# Patient Record
Sex: MALE | Race: WHITE | NOT HISPANIC OR LATINO | Employment: UNEMPLOYED | URBAN - NONMETROPOLITAN AREA
[De-identification: names, ages, dates, MRNs, and addresses within clinical notes are randomized per-mention and may not be internally consistent; named-entity substitution may affect disease eponyms.]

---

## 2022-11-14 ENCOUNTER — HOSPITAL ENCOUNTER (EMERGENCY)
Facility: HOSPITAL | Age: 22
Discharge: PSYCHIATRIC OR SUBSTANCE USE FACILITY | End: 2022-11-15
Attending: STUDENT IN AN ORGANIZED HEALTH CARE EDUCATION/TRAINING PROGRAM | Admitting: STUDENT IN AN ORGANIZED HEALTH CARE EDUCATION/TRAINING PROGRAM

## 2022-11-14 DIAGNOSIS — F19.10 POLYSUBSTANCE ABUSE: Primary | ICD-10-CM

## 2022-11-14 LAB
BASOPHILS # BLD AUTO: 0.06 10*3/MM3 (ref 0–0.2)
BASOPHILS NFR BLD AUTO: 0.7 % (ref 0–1.5)
DEPRECATED RDW RBC AUTO: 39.4 FL (ref 37–54)
EOSINOPHIL # BLD AUTO: 0.06 10*3/MM3 (ref 0–0.4)
EOSINOPHIL NFR BLD AUTO: 0.7 % (ref 0.3–6.2)
ERYTHROCYTE [DISTWIDTH] IN BLOOD BY AUTOMATED COUNT: 12.2 % (ref 12.3–15.4)
HCT VFR BLD AUTO: 44.6 % (ref 37.5–51)
HGB BLD-MCNC: 15.6 G/DL (ref 13–17.7)
IMM GRANULOCYTES # BLD AUTO: 0.02 10*3/MM3 (ref 0–0.05)
IMM GRANULOCYTES NFR BLD AUTO: 0.2 % (ref 0–0.5)
LYMPHOCYTES # BLD AUTO: 2.11 10*3/MM3 (ref 0.7–3.1)
LYMPHOCYTES NFR BLD AUTO: 25.4 % (ref 19.6–45.3)
MCH RBC QN AUTO: 30.7 PG (ref 26.6–33)
MCHC RBC AUTO-ENTMCNC: 35 G/DL (ref 31.5–35.7)
MCV RBC AUTO: 87.8 FL (ref 79–97)
MONOCYTES # BLD AUTO: 0.39 10*3/MM3 (ref 0.1–0.9)
MONOCYTES NFR BLD AUTO: 4.7 % (ref 5–12)
NEUTROPHILS NFR BLD AUTO: 5.66 10*3/MM3 (ref 1.7–7)
NEUTROPHILS NFR BLD AUTO: 68.3 % (ref 42.7–76)
NRBC BLD AUTO-RTO: 0 /100 WBC (ref 0–0.2)
PLATELET # BLD AUTO: 329 10*3/MM3 (ref 140–450)
PMV BLD AUTO: 8.5 FL (ref 6–12)
RBC # BLD AUTO: 5.08 10*6/MM3 (ref 4.14–5.8)
WBC NRBC COR # BLD: 8.3 10*3/MM3 (ref 3.4–10.8)

## 2022-11-14 PROCEDURE — 36415 COLL VENOUS BLD VENIPUNCTURE: CPT

## 2022-11-14 PROCEDURE — 80053 COMPREHEN METABOLIC PANEL: CPT | Performed by: STUDENT IN AN ORGANIZED HEALTH CARE EDUCATION/TRAINING PROGRAM

## 2022-11-14 PROCEDURE — 85025 COMPLETE CBC W/AUTO DIFF WBC: CPT | Performed by: STUDENT IN AN ORGANIZED HEALTH CARE EDUCATION/TRAINING PROGRAM

## 2022-11-14 PROCEDURE — 99285 EMERGENCY DEPT VISIT HI MDM: CPT

## 2022-11-14 PROCEDURE — 83735 ASSAY OF MAGNESIUM: CPT | Performed by: STUDENT IN AN ORGANIZED HEALTH CARE EDUCATION/TRAINING PROGRAM

## 2022-11-14 PROCEDURE — 82077 ASSAY SPEC XCP UR&BREATH IA: CPT | Performed by: STUDENT IN AN ORGANIZED HEALTH CARE EDUCATION/TRAINING PROGRAM

## 2022-11-14 RX ORDER — ONDANSETRON 4 MG/1
4 TABLET, ORALLY DISINTEGRATING ORAL ONCE
Status: COMPLETED | OUTPATIENT
Start: 2022-11-14 | End: 2022-11-15

## 2022-11-14 RX ORDER — LORAZEPAM 1 MG/1
1 TABLET ORAL ONCE
Status: COMPLETED | OUTPATIENT
Start: 2022-11-14 | End: 2022-11-15

## 2022-11-15 ENCOUNTER — HOSPITAL ENCOUNTER (INPATIENT)
Facility: HOSPITAL | Age: 22
LOS: 1 days | Discharge: LEFT BEFORE DISCHARGE | End: 2022-11-15
Attending: PSYCHIATRY & NEUROLOGY | Admitting: PSYCHIATRY & NEUROLOGY

## 2022-11-15 VITALS
WEIGHT: 188 LBS | BODY MASS INDEX: 26.92 KG/M2 | RESPIRATION RATE: 16 BRPM | HEIGHT: 70 IN | HEART RATE: 83 BPM | TEMPERATURE: 98.1 F | OXYGEN SATURATION: 99 % | SYSTOLIC BLOOD PRESSURE: 133 MMHG | DIASTOLIC BLOOD PRESSURE: 85 MMHG

## 2022-11-15 VITALS
DIASTOLIC BLOOD PRESSURE: 82 MMHG | WEIGHT: 185 LBS | BODY MASS INDEX: 26.48 KG/M2 | TEMPERATURE: 98.7 F | SYSTOLIC BLOOD PRESSURE: 127 MMHG | OXYGEN SATURATION: 98 % | HEART RATE: 116 BPM | HEIGHT: 70 IN | RESPIRATION RATE: 18 BRPM

## 2022-11-15 PROBLEM — B19.20 HEPATITIS C TEST POSITIVE: Status: ACTIVE | Noted: 2022-11-15

## 2022-11-15 PROBLEM — F15.20 METHAMPHETAMINE USE DISORDER, MODERATE, DEPENDENCE: Status: ACTIVE | Noted: 2022-11-15

## 2022-11-15 PROBLEM — F17.200 NICOTINE USE DISORDER: Status: ACTIVE | Noted: 2022-11-15

## 2022-11-15 PROBLEM — F11.20 OPIOID DEPENDENCE: Status: ACTIVE | Noted: 2022-11-15

## 2022-11-15 PROBLEM — F12.20 TETRAHYDROCANNABINOL (THC) USE DISORDER, MODERATE, DEPENDENCE: Status: ACTIVE | Noted: 2022-11-15

## 2022-11-15 LAB
ALBUMIN SERPL-MCNC: 4.6 G/DL (ref 3.5–5.2)
ALBUMIN/GLOB SERPL: 1.2 G/DL
ALP SERPL-CCNC: 68 U/L (ref 39–117)
ALT SERPL W P-5'-P-CCNC: 77 U/L (ref 1–41)
AMPHET+METHAMPHET UR QL: POSITIVE
AMPHETAMINES UR QL: POSITIVE
ANION GAP SERPL CALCULATED.3IONS-SCNC: 12.8 MMOL/L (ref 5–15)
AST SERPL-CCNC: 45 U/L (ref 1–40)
BARBITURATES UR QL SCN: NEGATIVE
BENZODIAZ UR QL SCN: NEGATIVE
BILIRUB SERPL-MCNC: 0.9 MG/DL (ref 0–1.2)
BILIRUB UR QL STRIP: NEGATIVE
BUN SERPL-MCNC: 8 MG/DL (ref 6–20)
BUN/CREAT SERPL: 10.5 (ref 7–25)
BUPRENORPHINE SERPL-MCNC: POSITIVE NG/ML
CALCIUM SPEC-SCNC: 9.6 MG/DL (ref 8.6–10.5)
CANNABINOIDS SERPL QL: POSITIVE
CHLORIDE SERPL-SCNC: 103 MMOL/L (ref 98–107)
CLARITY UR: CLEAR
CO2 SERPL-SCNC: 24.2 MMOL/L (ref 22–29)
COCAINE UR QL: NEGATIVE
COLOR UR: YELLOW
CREAT SERPL-MCNC: 0.76 MG/DL (ref 0.76–1.27)
EGFRCR SERPLBLD CKD-EPI 2021: 130.3 ML/MIN/1.73
ETHANOL BLD-MCNC: <10 MG/DL (ref 0–10)
ETHANOL UR QL: <0.01 %
FLUAV RNA RESP QL NAA+PROBE: NOT DETECTED
FLUBV RNA RESP QL NAA+PROBE: NOT DETECTED
GLOBULIN UR ELPH-MCNC: 3.9 GM/DL
GLUCOSE SERPL-MCNC: 108 MG/DL (ref 65–99)
GLUCOSE UR STRIP-MCNC: NEGATIVE MG/DL
HAV IGM SERPL QL IA: ABNORMAL
HBV CORE IGM SERPL QL IA: ABNORMAL
HBV SURFACE AG SERPL QL IA: ABNORMAL
HCV AB SER DONR QL: REACTIVE
HGB UR QL STRIP.AUTO: NEGATIVE
HIV1+2 AB SER QL: NORMAL
KETONES UR QL STRIP: NEGATIVE
LEUKOCYTE ESTERASE UR QL STRIP.AUTO: NEGATIVE
MAGNESIUM SERPL-MCNC: 1.9 MG/DL (ref 1.6–2.6)
METHADONE UR QL SCN: NEGATIVE
NITRITE UR QL STRIP: NEGATIVE
OPIATES UR QL: POSITIVE
OXYCODONE UR QL SCN: NEGATIVE
PCP UR QL SCN: NEGATIVE
PH UR STRIP.AUTO: 8 [PH] (ref 5–8)
POTASSIUM SERPL-SCNC: 4.1 MMOL/L (ref 3.5–5.2)
PROPOXYPH UR QL: NEGATIVE
PROT SERPL-MCNC: 8.5 G/DL (ref 6–8.5)
PROT UR QL STRIP: NEGATIVE
QT INTERVAL: 382 MS
QTC INTERVAL: 400 MS
SARS-COV-2 RNA RESP QL NAA+PROBE: NOT DETECTED
SODIUM SERPL-SCNC: 140 MMOL/L (ref 136–145)
SP GR UR STRIP: 1.01 (ref 1–1.03)
TRICYCLICS UR QL SCN: NEGATIVE
UROBILINOGEN UR QL STRIP: NORMAL

## 2022-11-15 PROCEDURE — 93005 ELECTROCARDIOGRAM TRACING: CPT | Performed by: PSYCHIATRY & NEUROLOGY

## 2022-11-15 PROCEDURE — 81003 URINALYSIS AUTO W/O SCOPE: CPT | Performed by: STUDENT IN AN ORGANIZED HEALTH CARE EDUCATION/TRAINING PROGRAM

## 2022-11-15 PROCEDURE — 99236 HOSP IP/OBS SAME DATE HI 85: CPT | Performed by: PSYCHIATRY & NEUROLOGY

## 2022-11-15 PROCEDURE — 80074 ACUTE HEPATITIS PANEL: CPT | Performed by: PSYCHIATRY & NEUROLOGY

## 2022-11-15 PROCEDURE — 93010 ELECTROCARDIOGRAM REPORT: CPT | Performed by: INTERNAL MEDICINE

## 2022-11-15 PROCEDURE — 87636 SARSCOV2 & INF A&B AMP PRB: CPT | Performed by: STUDENT IN AN ORGANIZED HEALTH CARE EDUCATION/TRAINING PROGRAM

## 2022-11-15 PROCEDURE — 80306 DRUG TEST PRSMV INSTRMNT: CPT | Performed by: STUDENT IN AN ORGANIZED HEALTH CARE EDUCATION/TRAINING PROGRAM

## 2022-11-15 PROCEDURE — 63710000001 ONDANSETRON ODT 4 MG TABLET DISPERSIBLE: Performed by: STUDENT IN AN ORGANIZED HEALTH CARE EDUCATION/TRAINING PROGRAM

## 2022-11-15 PROCEDURE — G0432 EIA HIV-1/HIV-2 SCREEN: HCPCS | Performed by: PSYCHIATRY & NEUROLOGY

## 2022-11-15 PROCEDURE — HZ2ZZZZ DETOXIFICATION SERVICES FOR SUBSTANCE ABUSE TREATMENT: ICD-10-PCS | Performed by: PSYCHIATRY & NEUROLOGY

## 2022-11-15 RX ORDER — DICYCLOMINE HYDROCHLORIDE 10 MG/1
10 CAPSULE ORAL 3 TIMES DAILY PRN
Status: DISCONTINUED | OUTPATIENT
Start: 2022-11-15 | End: 2022-11-15 | Stop reason: HOSPADM

## 2022-11-15 RX ORDER — CLONIDINE HYDROCHLORIDE 0.1 MG/1
0.1 TABLET ORAL 2 TIMES DAILY PRN
Status: DISCONTINUED | OUTPATIENT
Start: 2022-11-18 | End: 2022-11-15 | Stop reason: HOSPADM

## 2022-11-15 RX ORDER — TRAZODONE HYDROCHLORIDE 50 MG/1
50 TABLET ORAL NIGHTLY PRN
Status: DISCONTINUED | OUTPATIENT
Start: 2022-11-15 | End: 2022-11-15 | Stop reason: HOSPADM

## 2022-11-15 RX ORDER — FAMOTIDINE 20 MG/1
20 TABLET, FILM COATED ORAL 2 TIMES DAILY PRN
Status: DISCONTINUED | OUTPATIENT
Start: 2022-11-15 | End: 2022-11-15 | Stop reason: HOSPADM

## 2022-11-15 RX ORDER — CYCLOBENZAPRINE HCL 10 MG
10 TABLET ORAL 3 TIMES DAILY PRN
Status: DISCONTINUED | OUTPATIENT
Start: 2022-11-15 | End: 2022-11-15 | Stop reason: HOSPADM

## 2022-11-15 RX ORDER — CLONIDINE HYDROCHLORIDE 0.1 MG/1
0.1 TABLET ORAL ONCE AS NEEDED
Status: DISCONTINUED | OUTPATIENT
Start: 2022-11-19 | End: 2022-11-15 | Stop reason: HOSPADM

## 2022-11-15 RX ORDER — HYDROXYZINE 50 MG/1
50 TABLET, FILM COATED ORAL EVERY 6 HOURS PRN
Status: DISCONTINUED | OUTPATIENT
Start: 2022-11-15 | End: 2022-11-15 | Stop reason: HOSPADM

## 2022-11-15 RX ORDER — BENZTROPINE MESYLATE 1 MG/1
2 TABLET ORAL ONCE AS NEEDED
Status: DISCONTINUED | OUTPATIENT
Start: 2022-11-15 | End: 2022-11-15 | Stop reason: HOSPADM

## 2022-11-15 RX ORDER — LOPERAMIDE HYDROCHLORIDE 2 MG/1
2 CAPSULE ORAL
Status: DISCONTINUED | OUTPATIENT
Start: 2022-11-15 | End: 2022-11-15 | Stop reason: HOSPADM

## 2022-11-15 RX ORDER — CLONIDINE HYDROCHLORIDE 0.1 MG/1
0.1 TABLET ORAL 4 TIMES DAILY PRN
Status: DISCONTINUED | OUTPATIENT
Start: 2022-11-16 | End: 2022-11-15 | Stop reason: HOSPADM

## 2022-11-15 RX ORDER — ALUMINA, MAGNESIA, AND SIMETHICONE 2400; 2400; 240 MG/30ML; MG/30ML; MG/30ML
15 SUSPENSION ORAL EVERY 6 HOURS PRN
Status: DISCONTINUED | OUTPATIENT
Start: 2022-11-15 | End: 2022-11-15 | Stop reason: HOSPADM

## 2022-11-15 RX ORDER — BENZTROPINE MESYLATE 1 MG/ML
1 INJECTION INTRAMUSCULAR; INTRAVENOUS ONCE AS NEEDED
Status: DISCONTINUED | OUTPATIENT
Start: 2022-11-15 | End: 2022-11-15 | Stop reason: HOSPADM

## 2022-11-15 RX ORDER — ACETAMINOPHEN 325 MG/1
650 TABLET ORAL EVERY 6 HOURS PRN
Status: DISCONTINUED | OUTPATIENT
Start: 2022-11-15 | End: 2022-11-15 | Stop reason: HOSPADM

## 2022-11-15 RX ORDER — CLONIDINE HYDROCHLORIDE 0.1 MG/1
0.1 TABLET ORAL ONCE
Status: COMPLETED | OUTPATIENT
Start: 2022-11-15 | End: 2022-11-15

## 2022-11-15 RX ORDER — BENZONATATE 100 MG/1
100 CAPSULE ORAL 3 TIMES DAILY PRN
Status: DISCONTINUED | OUTPATIENT
Start: 2022-11-15 | End: 2022-11-15 | Stop reason: HOSPADM

## 2022-11-15 RX ORDER — CLONIDINE HYDROCHLORIDE 0.1 MG/1
0.1 TABLET ORAL 4 TIMES DAILY PRN
Status: DISCONTINUED | OUTPATIENT
Start: 2022-11-15 | End: 2022-11-15 | Stop reason: HOSPADM

## 2022-11-15 RX ORDER — CLONIDINE HYDROCHLORIDE 0.1 MG/1
0.1 TABLET ORAL 3 TIMES DAILY PRN
Status: DISCONTINUED | OUTPATIENT
Start: 2022-11-17 | End: 2022-11-15 | Stop reason: HOSPADM

## 2022-11-15 RX ORDER — IBUPROFEN 400 MG/1
400 TABLET ORAL EVERY 6 HOURS PRN
Status: DISCONTINUED | OUTPATIENT
Start: 2022-11-15 | End: 2022-11-15 | Stop reason: HOSPADM

## 2022-11-15 RX ORDER — HYDRALAZINE HYDROCHLORIDE 25 MG/1
25 TABLET, FILM COATED ORAL DAILY PRN
Status: DISCONTINUED | OUTPATIENT
Start: 2022-11-15 | End: 2022-11-15 | Stop reason: HOSPADM

## 2022-11-15 RX ORDER — ONDANSETRON 4 MG/1
4 TABLET, FILM COATED ORAL EVERY 6 HOURS PRN
Status: DISCONTINUED | OUTPATIENT
Start: 2022-11-15 | End: 2022-11-15 | Stop reason: HOSPADM

## 2022-11-15 RX ORDER — ECHINACEA PURPUREA EXTRACT 125 MG
2 TABLET ORAL AS NEEDED
Status: DISCONTINUED | OUTPATIENT
Start: 2022-11-15 | End: 2022-11-15 | Stop reason: HOSPADM

## 2022-11-15 RX ADMIN — CLONIDINE HYDROCHLORIDE 0.1 MG: 0.1 TABLET ORAL at 00:55

## 2022-11-15 RX ADMIN — ONDANSETRON 4 MG: 4 TABLET, ORALLY DISINTEGRATING ORAL at 00:10

## 2022-11-15 RX ADMIN — LORAZEPAM 1 MG: 1 TABLET ORAL at 00:10

## 2022-11-15 NOTE — DISCHARGE SUMMARY
":  2000  MRN:  [id number]  Visit Number:  [id number]      Date of Admission:11/15/2022   Date of Discharge:  11/15/2022    Discharge Diagnosis:  Principal Problem:    Opioid use disorder, severe, dependence (HCC)  Active Problems:    Methamphetamine use disorder, moderate, dependence (HCC)    Tetrahydrocannabinol (THC) use disorder, moderate, dependence (HCC)    Nicotine use disorder    Hepatitis C test positive        Admission Diagnosis:  Opioid dependence (HCC) [F11.20]     HPI  Juan R Cruz is a 22 y.o. male who was admitted on 11/15/2022 with complaints of heroin use and withdrawals.  For details please see H&P dated 11/15/22.    Hospital Course  Patient is a 22 y.o. male presented with opioid use and withdrawals. He was admitted to the detox recovery unit and started on clonidine detox.  Later the same day he reported that he did not want to continue in treatment.  He reported that he came to the hospital because his mother wanted him to and he had talked to his mother and told her that he was not happy in the detox unit and wanted to leave.  He reported he was worried that he may have severe withdrawal but he was not experiencing bad withdrawal symptoms and felt he could do it on his own.  Patient was encouraged to stay and complete the detox and then go to rehab but he was not interested.  He was then discharged AMA.      Mental Status Exam upon discharge:   Mood \"anxious\"   Affect-congruent, appropriate, stable  Thought Content-goal directed, no delusional material present  Thought process-linear, organized.  Suicidality: No SI  Homicidality: No HI  Perception: No AH/    Procedures Performed         Consults:   Consults     No orders found from 10/17/2022 to 2022.          Pertinent Test Results:   Admission on 11/15/2022   Component Date Value Ref Range Status   • QT Interval 11/15/2022 382  ms Preliminary   • QTC Interval 11/15/2022 400  ms Preliminary   • Hepatitis B Surface Ag " 11/15/2022 Non-Reactive  Non-Reactive Final   • Hep A IgM 11/15/2022 Non-Reactive  Non-Reactive Final   • Hep B C IgM 11/15/2022 Non-Reactive  Non-Reactive Final   • Hepatitis C Ab 11/15/2022 Reactive (A)  Non-Reactive Final   • HIV-1/ HIV-2 11/15/2022 Non-Reactive  Non-Reactive Final    A non-reactive test result does not preclude the possibility of exposure to HIV or infection with HIV. An antibody response to recent exposure may take several months to reach detectable levels.   Admission on 11/14/2022, Discharged on 11/15/2022   Component Date Value Ref Range Status   • Glucose 11/14/2022 108 (H)  65 - 99 mg/dL Final   • BUN 11/14/2022 8  6 - 20 mg/dL Final   • Creatinine 11/14/2022 0.76  0.76 - 1.27 mg/dL Final   • Sodium 11/14/2022 140  136 - 145 mmol/L Final   • Potassium 11/14/2022 4.1  3.5 - 5.2 mmol/L Final   • Chloride 11/14/2022 103  98 - 107 mmol/L Final   • CO2 11/14/2022 24.2  22.0 - 29.0 mmol/L Final   • Calcium 11/14/2022 9.6  8.6 - 10.5 mg/dL Final   • Total Protein 11/14/2022 8.5  6.0 - 8.5 g/dL Final   • Albumin 11/14/2022 4.60  3.50 - 5.20 g/dL Final   • ALT (SGPT) 11/14/2022 77 (H)  1 - 41 U/L Final   • AST (SGOT) 11/14/2022 45 (H)  1 - 40 U/L Final   • Alkaline Phosphatase 11/14/2022 68  39 - 117 U/L Final   • Total Bilirubin 11/14/2022 0.9  0.0 - 1.2 mg/dL Final   • Globulin 11/14/2022 3.9  gm/dL Final   • A/G Ratio 11/14/2022 1.2  g/dL Final   • BUN/Creatinine Ratio 11/14/2022 10.5  7.0 - 25.0 Final   • Anion Gap 11/14/2022 12.8  5.0 - 15.0 mmol/L Final   • eGFR 11/14/2022 130.3  >60.0 mL/min/1.73 Final    National Kidney Foundation and American Society of Nephrology (ASN) Task Force recommended calculation based on the Chronic Kidney Disease Epidemiology Collaboration (CKD-EPI) equation refit without adjustment for race.   • Color, UA 11/15/2022 Yellow  Yellow, Straw Final   • Appearance, UA 11/15/2022 Clear  Clear Final   • pH, UA 11/15/2022 8.0  5.0 - 8.0 Final   • Specific [place], UA  11/15/2022 1.013  1.005 - 1.030 Final   • Glucose, UA 11/15/2022 Negative  Negative Final   • Ketones, UA 11/15/2022 Negative  Negative Final   • Bilirubin, UA 11/15/2022 Negative  Negative Final   • Blood, UA 11/15/2022 Negative  Negative Final   • Protein, UA 11/15/2022 Negative  Negative Final   • Leuk Esterase, UA 11/15/2022 Negative  Negative Final   • Nitrite, UA 11/15/2022 Negative  Negative Final   • Urobilinogen, UA 11/15/2022 0.2 E.U./dL  0.2 - 1.0 E.U./dL Final   • THC, Screen, Urine 11/15/2022 Positive (A)  Negative Final   • Phencyclidine (PCP), Urine 11/15/2022 Negative  Negative Final   • Cocaine Screen, Urine 11/15/2022 Negative  Negative Final   • Methamphetamine, Ur 11/15/2022 Positive (A)  Negative Final   • Opiate Screen 11/15/2022 Positive (A)  Negative Final   • Amphetamine Screen, Urine 11/15/2022 Positive (A)  Negative Final   • Benzodiazepine Screen, Urine 11/15/2022 Negative  Negative Final   • Tricyclic Antidepressants Screen 11/15/2022 Negative  Negative Final   • Methadone Screen, Urine 11/15/2022 Negative  Negative Final   • Barbiturates Screen, Urine 11/15/2022 Negative  Negative Final   • Oxycodone Screen, Urine 11/15/2022 Negative  Negative Final   • Propoxyphene Screen 11/15/2022 Negative  Negative Final   • Buprenorphine, Screen, Urine 11/15/2022 Positive (A)  Negative Final   • Magnesium 11/14/2022 1.9  1.6 - 2.6 mg/dL Final   • Ethanol 11/14/2022 <10  0 - 10 mg/dL Final   • Ethanol % 11/14/2022 <0.010  % Final   • WBC 11/14/2022 8.30  3.40 - 10.80 10*3/mm3 Final   • RBC 11/14/2022 5.08  4.14 - 5.80 10*6/mm3 Final   • Hemoglobin 11/14/2022 15.6  13.0 - 17.7 g/dL Final   • Hematocrit 11/14/2022 44.6  37.5 - 51.0 % Final   • MCV 11/14/2022 87.8  79.0 - 97.0 fL Final   • MCH 11/14/2022 30.7  26.6 - 33.0 pg Final   • MCHC 11/14/2022 35.0  31.5 - 35.7 g/dL Final   • RDW 11/14/2022 12.2 (L)  12.3 - 15.4 % Final   • RDW-SD 11/14/2022 39.4  37.0 - 54.0 fl Final   • MPV 11/14/2022 8.5  6.0  - 12.0 fL Final   • Platelets 11/14/2022 329  140 - 450 10*3/mm3 Final   • Neutrophil % 11/14/2022 68.3  42.7 - 76.0 % Final   • Lymphocyte % 11/14/2022 25.4  19.6 - 45.3 % Final   • Monocyte % 11/14/2022 4.7 (L)  5.0 - 12.0 % Final   • Eosinophil % 11/14/2022 0.7  0.3 - 6.2 % Final   • Basophil % 11/14/2022 0.7  0.0 - 1.5 % Final   • Immature Grans % 11/14/2022 0.2  0.0 - 0.5 % Final   • Neutrophils, Absolute 11/14/2022 5.66  1.70 - 7.00 10*3/mm3 Final   • Lymphocytes, Absolute 11/14/2022 2.11  0.70 - 3.10 10*3/mm3 Final   • Monocytes, Absolute 11/14/2022 0.39  0.10 - 0.90 10*3/mm3 Final   • Eosinophils, Absolute 11/14/2022 0.06  0.00 - 0.40 10*3/mm3 Final   • Basophils, Absolute 11/14/2022 0.06  0.00 - 0.20 10*3/mm3 Final   • Immature Grans, Absolute 11/14/2022 0.02  0.00 - 0.05 10*3/mm3 Final   • nRBC 11/14/2022 0.0  0.0 - 0.2 /100 WBC Final   • COVID19 11/15/2022 Not Detected  Not Detected - Ref. Range Final   • Influenza A PCR 11/15/2022 Not Detected  Not Detected Final   • Influenza B PCR 11/15/2022 Not Detected  Not Detected Final        Condition on Discharge:  guarded    Vital Signs  Temp:  [97.5 °F (36.4 °C)-99.8 °F (37.7 °C)] 98.1 °F (36.7 °C)  Heart Rate:  [] 83  Resp:  [16-20] 16  BP: (116-154)/(67-91) 133/85      Discharge Disposition:  Left Against Medical Advice    Discharge Medications:     Discharge Medications      Patient Not Prescribed Medications Upon Discharge         Discharge Diet: Regular     Activity at Discharge: As tolerated     Follow-up Appointments  No future appointments.      Test Results Pending at Discharge      Time spent in discharge: > 30 min    Clinician:   Crystal Bradley MD  11/15/22  13:28 EST

## 2022-11-15 NOTE — NURSING NOTE
"Admit from ED. Reports using 3-4 little baggies daily heroin iv, meth twice a week \"a little rock\" IV. Pt has numerous track marks leon in ac area and inner forearms. Rates anxiety depression and craving 8. Withdrawal include hot/col sweats nausea and piloerection. Pt reports no previous admissions    "

## 2022-11-15 NOTE — PROGRESS NOTES
Discharge Planning Assessment  [facility]     Patient Name: Juan R Cruz  MRN: [id number]  Today's Date: 11/15/2022    Admit Date: 11/15/2022    Patient left AMA today. This therapist met with Patient who declined assistance with any type of aftercare. This therapist offered a list of resources and he also declined.         RAMOS Plata

## 2022-11-15 NOTE — H&P
INITIAL PSYCHIATRIC HISTORY & PHYSICAL    Patient Identification:  Name:  Juan R Cruz  Age:  22 y.o.  Sex:  male  :  2000  MRN:  [id number]   Visit Number:  [id number]  Primary Care Physician:  Provider, No Known    SUBJECTIVE    CC/Focus of Exam: heroin use    HPI: Jua nR Cruz is a 22 y.o. male who was admitted on 11/15/2022 with complaints of heroin use and withdrawals. The patient reports a long history of substance use. First use was at age 20 when he took Roxycodone 30 mg about two daily for recreational purposes. For the past couple of months he has been injecting heroin. Over time the use increased and the patient  continued to use despite negative consequences including health problems. The patient endorses symptoms of tolerance and withdrawals. Has tried to cut down and stop but has not been successful. Spends too much time and resources in pursuit of substance use. Longest period of sobriety is reported to be four days.  Currently using heroin, about 3-4 bags daily IV. Also methamphetamine twice a week about a gram IV.   Last use of heroin was yesterday and meth day before that. He also took a quarter of Subutex 8 mg and it made him feel worse.   Withdrawal symptoms include restless legs, insomnia, anxiety, nausea.     PAST PSYCHIATRIC HX: None reported.     SUBSTANCE USE HX: See HPI. Smokes marijuana about twice a week for the last three years. Also smokes half ppd of cigarettes.     SOCIAL HX:   Social History     Socioeconomic History   • Marital status: Single   Tobacco Use   • Smoking status: Every Day     Years: 3.00     Types: Cigarettes     Start date:    • Smokeless tobacco: Never   Vaping Use   • Vaping Use: Every day   • Substances: Nicotine, Flavoring   • Devices: Pre-filled or refillable cartridge   Substance and Sexual Activity   • Alcohol use: Not Currently   • Drug use: Yes     Types: Heroin   • Sexual activity: Yes     Partners: Female         Past Medical  History:   Diagnosis Date   • Anxiety    • Substance abuse (HCC)           Past Surgical History:   Procedure Laterality Date   • GALLBLADDER SURGERY     • TONSILLECTOMY         Family History   Problem Relation Age of Onset   • Drug abuse Maternal Uncle          No medications prior to admission.         ALLERGIES:  Patient has no known allergies.    Temp:  [97.5 °F (36.4 °C)-99.8 °F (37.7 °C)] 97.9 °F (36.6 °C)  Heart Rate:  [] 72  Resp:  [16-20] 18  BP: (116-154)/(67-91) 116/69    REVIEW OF SYSTEMS:  Review of Systems   Constitutional: Positive for chills and fatigue.   HENT: Positive for rhinorrhea.    Eyes: Negative.    Respiratory: Negative.    Cardiovascular: Negative.    Gastrointestinal: Negative.    Endocrine: Negative.    Genitourinary: Negative.    Musculoskeletal: Positive for arthralgias and myalgias.   Skin: Negative.    Allergic/Immunologic: Negative.    Neurological: Positive for tremors and weakness.   Hematological: Bruises/bleeds easily.   Psychiatric/Behavioral: Positive for dysphoric mood. The patient is nervous/anxious.         OBJECTIVE    PHYSICAL EXAM:  Physical Exam  Constitutional:  Appears well-developed and well-nourished.   HENT:   Head: Normocephalic and atraumatic.   Right Ear: External ear normal.   Left Ear: External ear normal.   Mouth/Throat: Oropharynx is clear and moist.   Eyes: Pupils are equal, round, and reactive to light. Conjunctivae and EOM are normal.   Neck: Normal range of motion. Neck supple.   Cardiovascular: Normal rate, regular rhythm and normal heart sounds.    Respiratory: Effort normal and breath sounds normal. No respiratory distress. No wheezes.   GI: Soft. Bowel sounds are normal.No distension. There is no tenderness.   Musculoskeletal: Normal range of motion. No edema or deformity.   Neurological:No cranial nerve deficit. Coordination normal.   Skin: Skin is warm and dry.Track marks and superficial skin wounds noted.        MENTAL STATUS EXAM:    Hygiene:   fair  Cooperation:  Cooperative  Eye Contact:  Fair  Psychomotor Behavior:  Appropriate  Affect:  Restricted  Hopelessness: Denies  Speech:  Normal  Goal directed  Thought Content:  Normal  Suicidal:  None  Homicidal:  None  Hallucinations:  None  Delusion:  None  Memory:  Intact  Orientation:  Person, Place, Time and Situation  Reliability:  fair  Insight:  Fair  Judgement:  Fair  Impulse Control:  Fair      Imaging Results (Last 24 Hours)     ** No results found for the last 24 hours. **           ECG/EMG Results (most recent)     Procedure Component Value Units Date/Time    ECG 12 Lead Other [[id number]] Collected: 11/15/22 0359     Updated: 11/15/22 0403     QT Interval 382 ms      QTC Interval 400 ms     Narrative:      Test Reason : Other~  Blood Pressure :   */*   mmHG  Vent. Rate :  66 BPM     Atrial Rate :  66 BPM     P-R Int : 170 ms          QRS Dur :  88 ms      QT Int : 382 ms       P-R-T Axes :  72  86  63 degrees     QTc Int : 400 ms    Sinus rhythm with marked sinus arrhythmia  Early repolarization  Otherwise normal ECG  No previous ECGs available    Referred By:            Confirmed By:            Lab Results   Component Value Date    GLUCOSE 108 (H) 11/14/2022    BUN 8 11/14/2022    CREATININE 0.76 11/14/2022    BCR 10.5 11/14/2022    CO2 24.2 11/14/2022    CALCIUM 9.6 11/14/2022    ALBUMIN 4.60 11/14/2022    AST 45 (H) 11/14/2022    ALT 77 (H) 11/14/2022       Lab Results   Component Value Date    WBC 8.30 11/14/2022    HGB 15.6 11/14/2022    HCT 44.6 11/14/2022    MCV 87.8 11/14/2022     11/14/2022       Last Urine Toxicity     LAST URINE TOXICITY RESULTS Latest Ref Rng & Units 11/15/2022    AMPHETAMINES SCREEN, URINE Negative Positive(A)    BARBITURATES SCREEN Negative Negative    BENZODIAZEPINE SCREEN, URINE Negative Negative    BUPRENORPHINEUR Negative Positive(A)    COCAINE SCREEN, URINE Negative Negative    METHADONE SCREEN, URINE Negative Negative    METHAMPHETAMINEUR  Negative Positive(A)          Brief Urine Lab Results  (Last result in the past 365 days)      Color   Clarity   Blood   Leuk Est   Nitrite   Protein   CREAT   Urine HCG        11/15/22 0001 Yellow   Clear   Negative   Negative   Negative   Negative                 DATA  Labs reviewed. Glucose 108, ALT 77, AST 45. Hep C reactive. UDS positive for amphetamine, buprenorphine, methamphetamine, opiate, thc.   EKG reviewed. QTc 400 ms  AGUSTIN reviewed. No active controlled rx noted.   Record reviewed. No previous treatments noted here for mental health problems or substance use problems.     Strengths: Motivated for treatment    Weaknesses:Substance use and Poor coping skills    Code status:  Full  Discussed code status with patient.    ASSESSMENT & PLAN:        Opioid use disorder, severe, dependence (HCC)  -Clonidine detox  -Supportive medications      Methamphetamine use disorder, moderate, dependence (HCC)  -Supportive treatment      Tetrahydrocannabinol (THC) use disorder, moderate, dependence (HCC)  -Supportive treatment      Nicotine use disorder  -Encourage cessation      The patient has been admitted for safety and stabilization.  Patient will be monitored for suicidality daily and maintained on Special Precautions Level 4 (q30 min checks).  The patient will have individual and group therapy with a master's level therapist. A master treatment plan will be developed and agreed upon by the patient and his/her treatment team.  The patient's estimated length of stay in the hospital is 5-7 days.

## 2022-11-15 NOTE — PLAN OF CARE
Goal Outcome Evaluation:  Plan of Care Reviewed With: patient  Patient Agreement with Plan of Care: agrees     Progress: no change  Outcome Evaluation: Pt new admit from ED. Pt reports using 3-4 litle baggies of heroin daily IV.

## 2022-11-15 NOTE — NURSING NOTE
Presented pt to Dr. Villead and all abnormal labs. New order to admit patient to CD. Routine orders. SP4. Clonidine detox protocol with comfort meds. Rbovx2.

## 2022-11-15 NOTE — ED PROVIDER NOTES
Subjective     History provided by:  Patient  Mental Health Problem  Patient accompanied by:  Caregiver  Degree of incapacity (severity):  Moderate  Onset quality:  Gradual  Timing:  Constant  Progression:  Worsening  Chronicity:  Recurrent  Context: drug abuse (heroine)    Treatment compliance:  Untreated  Relieved by:  Nothing  Associated symptoms: irritability    Associated symptoms: no abdominal pain and no chest pain        Review of Systems   Constitutional: Positive for irritability. Negative for fever.   HENT: Negative.    Respiratory: Negative.    Cardiovascular: Negative.  Negative for chest pain.   Gastrointestinal: Positive for nausea. Negative for abdominal pain.   Endocrine: Negative.    Genitourinary: Negative.  Negative for dysuria.   Skin: Negative.    Neurological: Negative.    Psychiatric/Behavioral: Negative.    All other systems reviewed and are negative.      Past Medical History:   Diagnosis Date   • Anxiety    • Substance abuse (HCC)        No Known Allergies    Past Surgical History:   Procedure Laterality Date   • GALLBLADDER SURGERY     • TONSILLECTOMY         No family history on file.    Social History     Socioeconomic History   • Marital status: Single   Tobacco Use   • Smoking status: Every Day     Years: 3.00     Types: Cigarettes     Start date: 2019   • Smokeless tobacco: Never           Objective   Physical Exam  Vitals and nursing note reviewed.   Constitutional:       General: He is not in acute distress.     Appearance: He is well-developed. He is not diaphoretic.   HENT:      Head: Normocephalic and atraumatic.      Right Ear: External ear normal.      Left Ear: External ear normal.      Nose: Nose normal.   Eyes:      Conjunctiva/sclera: Conjunctivae normal.   Neck:      Vascular: No JVD.      Trachea: No tracheal deviation.   Cardiovascular:      Rate and Rhythm: Normal rate.      Heart sounds: No murmur heard.  Pulmonary:      Effort: Pulmonary effort is normal. No  respiratory distress.      Breath sounds: No wheezing.   Abdominal:      Palpations: Abdomen is soft.      Tenderness: There is no abdominal tenderness.   Musculoskeletal:         General: No deformity. Normal range of motion.      Cervical back: Normal range of motion and neck supple.   Skin:     General: Skin is warm and dry.      Coloration: Skin is not pale.      Findings: No erythema or rash.   Neurological:      Mental Status: He is alert and oriented to person, place, and time.      Cranial Nerves: No cranial nerve deficit.   Psychiatric:      Comments: States that last use of heroin was earlier today.  States that he has been using heroin over the last 2 months.  Patient is not sleeping.         Procedures           ED Course  ED Course as of 11/15/22 0047   Tue Nov 15, 2022   0046 Patient has been accepted for inpatient behavioral health treatment [RB]      ED Course User Index  [RB] Tj Garcia II, PA                                           MDM  Number of Diagnoses or Management Options  Polysubstance abuse (HCC): new and requires workup     Amount and/or Complexity of Data Reviewed  Clinical lab tests: ordered and reviewed  Discuss the patient with other providers: yes    Risk of Complications, Morbidity, and/or Mortality  Presenting problems: moderate  Diagnostic procedures: moderate  Management options: moderate    Patient Progress  Patient progress: stable      Final diagnoses:   Polysubstance abuse (HCC)       ED Disposition  ED Disposition     ED Disposition   DC/Transfer to Behavioral Health    Condition   Stable    Comment   --             No follow-up provider specified.       Medication List      No changes were made to your prescriptions during this visit.          Tj Garcia II, PA  11/15/22 0047

## 2022-11-15 NOTE — NURSING NOTE
"Patient presents requesting detox off of heroin. He reports using \"3 or 4 baggies\" IV daily for the past 2 months. He reports his last use was 11/14/22 A.M. He reports he started using at age 21 and had a few months of sobriety. Patient also reports smoking marijuana a gram a day. He reports he took a suboxone tonight thinking it would help the w/d symptoms but denies regular use. He denies any other substance use at this time. He denies si, hi, and avh. He reports poor sleep and poor appetite. He rates his craving 10/10. He rates anxiety and depression 7/10. Cows 12  "

## 2022-11-16 NOTE — PROGRESS NOTES
Behavioral Health Discharge Summary             Please fax within 24 hours of discharge to [facility] at: [phone]      Member Name: Juan R Cruz Member ID: [id number]   Authorization Number: [id number] Phone: [phone]   Member Address: [address] [place] [id number]   Discharge Date: 11/15/2022 Level of Care at Discharge:    Facility: [facility] Staff Completing Form: Zuri PARIS RN U.R.   If the member is being discharged directly to a residential or extended care program, please specify the type below.   __Private Child-Caring Facility (PCC) Residential/Group Home   __Private Child-Caring Facility (PCC) Therapeutic Foster Care   __Residential Treatment Facility (RTF)   __Psychiatric Residential Treatment Facility (PRTF I or II)   __Long-Term Acute Inpatient Hospital Services or Extended Care Unit (ECU)   __Other (please specify):    Brief discharge summary of treatment received (for follow up by the case management team): D/C clinical with list of medications and follow up appts given to patient upon discharge.     BRIEF SUMMARY OF RECOMMENDATIONS FOR ONGOING TREATMENT     Discharged to where: Home LEFT AGAINST MEDICAL ADVICE   Discharge diagnoses: F 11.20   Axis I:    Axis II:    Axis III:    Axis IV:    Axis V:    Does the member understand his/her DX?  Yes          Medication     Dose     Schedule Supply/  Quantity  Given at Discharge RX Provided  Yes/No  If Rx Provided, Quantity RX Prior Auth Required  Yes/No Prior Auth  Completed                                                                                             Does the member understand the reason for taking these medications? Yes                                                           FOLLOW-UP APPOINTMENTS   Please schedule within 7 days of discharge and provide appointment details for all referred services.    PCP/Other Providers Involved in Treatment:    Appointment Type: PT LEFT  AMA REFUSED ANY AFTER CARE APPT  Provider Name:  Provider Phone:  Appointment Date:  Appointment Time:      Assessment   (new to OP services)        Case Management    Is the member already enrolled in case management?  Yes/No  If yes, date the CM was notified:    If no, was the CM referral offered?  Yes/No  Accepted? Yes/No    Is the Release of Information in the chart? Yes/No:      Medication Management (for member discharged with psychiatric medications):      A&D Treatment (for member with substance abuse/   dependence in the past year):      Medical Condition (for member with a medical condition):    Other recommended treatment:    Do you have any concerns about the discharge plan?  No    If yes, explain:    Was the member involved in the discharge planning?  Yes    If no, explain:    Was a copy of the discharge plan provided to the member?  Yes    If no, explain: